# Patient Record
(demographics unavailable — no encounter records)

---

## 2020-06-26 NOTE — RADIOLOGY REPORT (SQ)
US PREGNANCY TRANSVAGINAL 



CLINICAL STATEMENT: abd cramping, 7 weeks preg



Findings: Uterus is anteverted and measures 8.8 x 5.6 x 6.0 cm.

Fetal heart rate measures one 44 bpm. Right ovary measures 3.0 x

1.9 x 2.2 cm. Left ovary measures 2.4 x 2.0 x 3.4 cm. Vascular

flow preserved within both ovaries on color and spectral Doppler

imaging. Single viable IUP of estimated gestational age 7 weeks

and 1 day. Cervix is closed and measures 2.6 cm. There is a small

hypoechoic region adjacent to the gestational sac measuring 1.1 x

1.3 cm.



IMPRESSION:



Single viable IUP of seven weeks and one day with mild adjacent

subchorionic hemorrhage.

## 2020-06-26 NOTE — ER DOCUMENT REPORT
ED Medical Screen (RME)





- General


Chief Complaint: Abdominal Cramping


Stated Complaint: ABDOMINAL CRAMPING -7 WKS PREGNANT


Time Seen by Provider: 20 19:24


Primary Care Provider: 


MARLENY BASILIO [Primary Care Provider] - Follow up as needed


TRAVEL OUTSIDE OF THE [place] IN LAST 30 DAYS: No





- HPI


Notes: 





20 19:26


20-year-old female , 7 weeks pregnant presents emergency room for abdominal 

cramping that is been occurring for the last 3 days.  Denies any vaginal 

bleeding.  Unsure of her last menstrual cycle.  Patient states she has had 

nausea them, did vomit 5 days ago, denies any fevers or chills.  Did call her 

OB/GYN nurse and they advised her to come to the emergency room for further 

evaluation.  Patient states that she did have a miscarriage previously.  Patient

states she has not had any vaginal bleeding.  Due to having a history of having 

a miscarriage, she wanted to get checked out today.  Denies any new medications 

foods or travel.  Patient is taking a prenatal vitamin





I have greeted and performed a rapid initial assessment of this patient.  A 

comprehensive ED assessment and evaluation of the patient, analysis of test 

results and completion of the medical decision making process will be conducted 

by additional ED providers.





PHYSICAL EXAMINATION:





GENERAL: Well-appearing, well-nourished and in no acute distress.





CV: s1, s2 regular 





LUNGS: No respiratory distress





Musculoskeletal: Normal range of motion





NEUROLOGICAL:  Normal speech, normal gait. 





SKIN: Warm, Dry, normal turgor, no rashes or lesions noted.


20 19:28








- Related Data


Allergies/Adverse Reactions: 


                                        





No Known Allergies Allergy (Verified 01/06/15 06:45)


   











Past Medical History





- Past Medical History


Cardiac Medical History: 


   Denies: Hx Heart Attack, Hx Hypertension


Pulmonary Medical History: 


   Denies: Hx Asthma


Neurological Medical History: Denies: Hx Cerebrovascular Accident, Hx Seizures


Endocrine Medical History: Denies: Hx Diabetes Mellitus Type 1


Renal/ Medical History: Denies: Hx Peritoneal Dialysis


GI Medical History: Denies: Hx Hepatitis, Hx Hiatal Hernia, Hx Ulcer


Infectious Medical History: Denies: Hx Hepatitis


Past Surgical History: Denies: Hx Hysterectomy, Hx Mastectomy, Hx Open Heart 

Surgery, Hx Pacemaker





- Immunizations


Immunizations up to date: Yes


Hx Diphtheria, Pertussis, Tetanus Vaccination: Yes





Physical Exam





- Vital signs


Vitals: 





                                        











Temp Pulse Resp BP Pulse Ox


 


 98.7 F   89   18   124/66   99 


 


 20 18:12  20 18:12  20 18:12  20 18:12  20 18:12














Course





- Vital Signs


Vital signs: 





                                        











Temp Pulse Resp BP Pulse Ox


 


 98.7 F   89   18   124/66   99 


 


 20 18:12  20 18:12  20 18:12  20 18:12  20 18:12














Doctor's Discharge





- Discharge


Referrals: 


LOCALMD,NO [Primary Care Provider] - Follow up as needed

## 2020-06-27 NOTE — ER DOCUMENT REPORT
ED General





- General


Chief Complaint: Abdominal Cramping


Stated Complaint: ABDOMINAL CRAMPING -7 WKS PREGNANT


Time Seen by Provider: 20 19:24


Primary Care Provider: 


MARLENY BASILIO [NO LOCAL MD] - Follow up as needed


TRAVEL OUTSIDE OF THE [place] IN LAST 30 DAYS: No





- HPI


Notes: 





Chief complaint: Lower abdominal cramping in early pregnancy





HPI: 20-year-old female  3 para 1 AB 1 at about 7 weeks by dates with no 

prenatal care comes in with 2 to 3-day history of mild intermittent suprapubic 

cramping.  No vaginal discharge or bleeding.  Denies fever, chills, nausea or 

vomiting.  Denies back pain.  Denies dysuria.





- Related Data


Allergies/Adverse Reactions: 


                                        





No Known Allergies Allergy (Verified 01/06/15 06:45)


   











Past Medical History





- General


Information source: Patient, [facility] Records





- Social History


Smoking Status: Never Smoker


Chew tobacco use (# tins/day): No


Frequency of alcohol use: None


Drug Abuse: None


Family History: Reviewed & Not Pertinent


Patient has homicidal ideation: Yes





- Past Medical History


Cardiac Medical History: 


   Denies: Hx Heart Attack, Hx Hypertension


Pulmonary Medical History: 


   Denies: Hx Asthma


Neurological Medical History: Denies: Hx Cerebrovascular Accident, Hx Seizures


Endocrine Medical History: Denies: Hx Diabetes Mellitus Type 1


Renal/ Medical History: Denies: Hx Peritoneal Dialysis


GI Medical History: Denies: Hx Hepatitis, Hx Hiatal Hernia, Hx Ulcer


Infectious Medical History: Denies: Hx Hepatitis


Past Surgical History: Denies: Hx Hysterectomy, Hx Mastectomy, Hx Open Heart 

Surgery, Hx Pacemaker





- Immunizations


Immunizations up to date: Yes


Hx Diphtheria, Pertussis, Tetanus Vaccination: Yes


Hx Pneumococcal Vaccination: 01





Review of Systems





- Review of Systems


Notes: 





Constitutional: Negative for fever.


HENT: Negative for sore throat.


Eyes: Negative for visual changes.


Cardiovascular: Negative for chest pain.


Respiratory: Negative for shortness of breath.


Gastrointestinal: As per HPI.


Genitourinary: As per HPI.


Musculoskeletal: Negative for back pain.


Skin: Negative for rash.


Neurological: Negative for headaches, weakness or numbness.





10 point ROS negative except as marked above and in HPI.








Physical Exam





- Vital signs


Vitals: 





                                        











Temp Pulse Resp BP Pulse Ox


 


 98.7 F   89   18   124/66   99 


 


 20 18:12  20 18:12  20 18:12  20 18:12  20 18:12














- Notes


Notes: 











GENERAL: Female patient of approximately stated age appearing in no acute 

distress.





SKIN: Good turgor no rashes.





HEAD: Normocephalic atraumatic.





EYES: PERRLA.  EOMI.  Conjunctivae and sclerae clear.





EARS: CANALS AND TMS CLEAR.





NOSE: CLEAR.





MOUTH: Moist mucosa.  Good dentition.  No stridor or edema.  No drooling.





NECK: Supple.  No masses or thyromegaly.  No adenopathy.  Carotids 2+ without 

bruits.  No JVD.





BACK: Symmetrical without tenderness.





CHEST: Respirations unlabored.  Breath sounds clear and symmetrical.





HEART: Regular rhythm.  No murmur gallop or rub.





ABDOMEN: Soft nontender without masses, organomegaly or rebound.  Bowel sounds 

normally active.  No bruits.





GENITALIA: Deferred.





EXTREMITIES: No edema.  No calf tenderness.  Cap refill less than 1.5 seconds.  

Dorsalis pedis and posterior tibial pulses 3+ and symmetrical.





NEUROLOGICAL: GCS 15.  Alert and oriented x3.  Normal gait.  Fluent speech.  

Cranial nerves II through XII intact.  Sensorimotor and cerebellar normal.  

Normal tone.





PSYCHIATRIC: Appropriate affect.





Course





- Re-evaluation


Re-evalutation: 





20 07:21


Patient advised of findings on ultrasound.  We gave her usual precautions for 

vaginal cramping/bleeding in early pregnancy.  Findings, clinical impression and

plan of treatment have been discussed with patient/family.  Understanding of 

current findings and recommendations has been acknowledged by them and there is 

agreement regarding disposition and follow-up.





- Vital Signs


Vital signs: 





                                        











Temp Pulse Resp BP Pulse Ox


 


 98.0 F   90   16   111/61   100 


 


 20 05:34  20 05:34  20 05:34  20 05:34  20 05:34














- Laboratory


Result Diagrams: 


                                 20 18:30





                                 20 18:30


Laboratory results interpreted by me: 





                                        











  20





  18:30 18:30 18:38


 


WBC  11.5 H  


 


Sodium   133.9 L 


 


Beta HCG, Quant   75469.00 H 


 


Ur Leukocyte Esterase    TRACE H














- Diagnostic Test


Radiology reviewed: Reports reviewed - OB ultrasound per radiologist: IUP at 7 

weeks 1 day with mild subchorionic hemorrhage





Discharge





- Discharge


Clinical Impression: 


 Pelvic pain affecting pregnancy in first trimester, antepartum





Condition: Stable


Disposition: HOME, SELF-CARE


Additional Instructions: 


Avoid intercourse and use of tampons until reevaluated by your OB provider.  You

may take Tylenol as needed for your discomfort.





Return here as needed for new or worsening symptoms:





Pain that is worsening or unimproved


Uncontrolled vomiting


High fever or shaking chills


Vaginal bleeding


Overall worsening


Referrals: 


LOCALMD,NO [NO LOCAL MD] - Follow up as needed